# Patient Record
Sex: FEMALE | Race: BLACK OR AFRICAN AMERICAN | Employment: FULL TIME | URBAN - METROPOLITAN AREA
[De-identification: names, ages, dates, MRNs, and addresses within clinical notes are randomized per-mention and may not be internally consistent; named-entity substitution may affect disease eponyms.]

---

## 2020-06-08 ENCOUNTER — HOSPITAL ENCOUNTER (EMERGENCY)
Age: 40
Discharge: LEFT BEFORE DISCHARGE | End: 2020-06-08
Attending: STUDENT IN AN ORGANIZED HEALTH CARE EDUCATION/TRAINING PROGRAM

## 2020-06-08 ENCOUNTER — APPOINTMENT (OUTPATIENT)
Dept: CT IMAGING | Age: 40
End: 2020-06-08

## 2020-06-08 VITALS
HEIGHT: 67 IN | DIASTOLIC BLOOD PRESSURE: 88 MMHG | SYSTOLIC BLOOD PRESSURE: 139 MMHG | OXYGEN SATURATION: 96 % | WEIGHT: 219.14 LBS | BODY MASS INDEX: 34.39 KG/M2 | TEMPERATURE: 98.1 F | RESPIRATION RATE: 16 BRPM | HEART RATE: 82 BPM

## 2020-06-08 LAB
BILIRUBIN URINE: NEGATIVE
BLOOD, URINE: ABNORMAL
CLARITY: ABNORMAL
COLOR: YELLOW
EPITHELIAL CELLS, UA: 2 /HPF (ref 0–5)
GLUCOSE URINE: NEGATIVE MG/DL
HCG(URINE) PREGNANCY TEST: NEGATIVE
HYALINE CASTS: 1 /LPF (ref 0–8)
KETONES, URINE: NEGATIVE MG/DL
LEUKOCYTE ESTERASE, URINE: NEGATIVE
MICROSCOPIC EXAMINATION: YES
NITRITE, URINE: NEGATIVE
PH UA: 6 (ref 5–8)
PROTEIN UA: NEGATIVE MG/DL
RBC UA: 4 /HPF (ref 0–4)
SPECIFIC GRAVITY UA: 1.01 (ref 1–1.03)
URINE REFLEX TO CULTURE: ABNORMAL
URINE TYPE: ABNORMAL
UROBILINOGEN, URINE: 0.2 E.U./DL
WBC UA: 3 /HPF (ref 0–5)

## 2020-06-08 PROCEDURE — 84703 CHORIONIC GONADOTROPIN ASSAY: CPT

## 2020-06-08 PROCEDURE — 81001 URINALYSIS AUTO W/SCOPE: CPT

## 2020-06-08 PROCEDURE — 99283 EMERGENCY DEPT VISIT LOW MDM: CPT

## 2020-06-08 RX ORDER — PROMETHAZINE HYDROCHLORIDE 25 MG/1
25 TABLET ORAL EVERY 6 HOURS PRN
COMMUNITY

## 2020-06-08 RX ORDER — CLONAZEPAM 0.5 MG/1
TABLET ORAL
COMMUNITY
Start: 2020-03-27

## 2020-06-08 RX ORDER — OLANZAPINE 20 MG/1
20 TABLET ORAL NIGHTLY
COMMUNITY

## 2020-06-08 RX ORDER — OXYCODONE HYDROCHLORIDE AND ACETAMINOPHEN 5; 325 MG/1; MG/1
1 TABLET ORAL EVERY 6 HOURS PRN
COMMUNITY
End: 2020-07-07

## 2020-06-08 ASSESSMENT — PAIN DESCRIPTION - ONSET: ONSET: ON-GOING

## 2020-06-08 ASSESSMENT — PAIN DESCRIPTION - DESCRIPTORS: DESCRIPTORS: ACHING

## 2020-06-08 ASSESSMENT — PAIN DESCRIPTION - PAIN TYPE: TYPE: ACUTE PAIN

## 2020-06-08 ASSESSMENT — PAIN DESCRIPTION - PROGRESSION: CLINICAL_PROGRESSION: GRADUALLY IMPROVING

## 2020-06-08 ASSESSMENT — PAIN SCALES - GENERAL: PAINLEVEL_OUTOF10: 7

## 2020-06-08 ASSESSMENT — PAIN DESCRIPTION - FREQUENCY: FREQUENCY: CONTINUOUS

## 2020-06-08 ASSESSMENT — PAIN DESCRIPTION - LOCATION: LOCATION: BACK

## 2020-06-08 ASSESSMENT — PAIN - FUNCTIONAL ASSESSMENT: PAIN_FUNCTIONAL_ASSESSMENT: ACTIVITIES ARE NOT PREVENTED

## 2020-06-09 NOTE — ED PROVIDER NOTES
female who presents with complaint of dizziness after taking Percocet for her back pain. Exam nontoxic, in no acute distress, tenderness to palpation on the lumbar area. Given this presentation I did obtain a UA to make sure her symptoms were not secondary to UTI. Given the fact that her pain has been ongoing for 6 more than 4 weeks with no complaints of trauma and tenderness to palpation on the exam, I decided to obtain a CT scan. However, in the process of work-up patient decided to elope from the emergency room. ClINICAL IMPRESSION:  1. Low back pain, unspecified back pain laterality, unspecified chronicity, unspecified whether sciatica present          PATIENT REFERRED TO:  No follow-up provider specified. DISCHARGE MEDICATIONS:  Discharge Medication List as of 6/8/2020 11:41 PM        DISCONTINUED MEDICATIONS:  Discharge Medication List as of 6/8/2020 11:41 PM        DISPOSITION Eloped - Left Before Treatment Complete 06/08/2020 11:38:53 PM  Madie Mensah MD (electronically signed)  6/8/2020  _________________________________________________________________________________________  _________________________________________________________________________________________  This record is transcribed utilizing voice recognition technology. There are inherent limitations in this technology. In addition, there may be limitations in editing of this report. If there are any discrepancies, please contact me directly.         Chris Arce MD  06/08/20 7129

## 2020-06-09 NOTE — ED NOTES
Pt comes to ER after taking percocet and phenegran tonight for back pain. Pt states feeling weak and tired. Pt states didn't know I was going to make pt feel this way.        Kristin Gimenez RN  06/08/20 4250

## 2020-07-07 ENCOUNTER — HOSPITAL ENCOUNTER (EMERGENCY)
Age: 40
Discharge: HOME | End: 2020-07-07

## 2020-07-07 VITALS
HEART RATE: 86 BPM | OXYGEN SATURATION: 100 % | DIASTOLIC BLOOD PRESSURE: 85 MMHG | TEMPERATURE: 98.3 F | RESPIRATION RATE: 18 BRPM | BODY MASS INDEX: 34.46 KG/M2 | WEIGHT: 220 LBS | SYSTOLIC BLOOD PRESSURE: 129 MMHG

## 2020-07-07 LAB — S PYO AG THROAT QL: NEGATIVE

## 2020-07-07 PROCEDURE — 87880 STREP A ASSAY W/OPTIC: CPT

## 2020-07-07 PROCEDURE — 87081 CULTURE SCREEN ONLY: CPT

## 2020-07-07 PROCEDURE — 99282 EMERGENCY DEPT VISIT SF MDM: CPT

## 2020-07-07 PROCEDURE — 6370000000 HC RX 637 (ALT 250 FOR IP): Performed by: PHYSICIAN ASSISTANT

## 2020-07-07 RX ORDER — ACETAMINOPHEN 325 MG/1
650 TABLET ORAL ONCE
Status: COMPLETED | OUTPATIENT
Start: 2020-07-07 | End: 2020-07-07

## 2020-07-07 RX ORDER — IBUPROFEN 800 MG/1
800 TABLET ORAL EVERY 8 HOURS PRN
Qty: 30 TABLET | Refills: 0 | Status: SHIPPED | OUTPATIENT
Start: 2020-07-07 | End: 2021-01-17 | Stop reason: ALTCHOICE

## 2020-07-07 RX ORDER — ACETAMINOPHEN 500 MG
500 TABLET ORAL 4 TIMES DAILY PRN
Qty: 60 TABLET | Refills: 0 | Status: SHIPPED | OUTPATIENT
Start: 2020-07-07 | End: 2021-01-17 | Stop reason: ALTCHOICE

## 2020-07-07 RX ADMIN — ACETAMINOPHEN 650 MG: 325 TABLET ORAL at 08:15

## 2020-07-07 ASSESSMENT — PAIN DESCRIPTION - ONSET: ONSET: ON-GOING

## 2020-07-07 ASSESSMENT — PAIN SCALES - GENERAL
PAINLEVEL_OUTOF10: 5
PAINLEVEL_OUTOF10: 5

## 2020-07-07 ASSESSMENT — PAIN DESCRIPTION - FREQUENCY: FREQUENCY: CONTINUOUS

## 2020-07-07 ASSESSMENT — PAIN DESCRIPTION - DESCRIPTORS: DESCRIPTORS: BURNING

## 2020-07-07 ASSESSMENT — PAIN DESCRIPTION - LOCATION: LOCATION: THROAT

## 2020-07-07 ASSESSMENT — PAIN DESCRIPTION - PAIN TYPE: TYPE: ACUTE PAIN

## 2020-07-07 ASSESSMENT — PAIN DESCRIPTION - PROGRESSION: CLINICAL_PROGRESSION: NOT CHANGED

## 2020-07-07 NOTE — ED PROVIDER NOTES
[address] [facility]  [address] [address]  Dept: [phone]  Loc: [address] ENCOUNTER        This patient was not seen or evaluated by the attending physician. I evaluated this patient, the attending physician was available for consultation. CHIEF COMPLAINT    Chief Complaint   Patient presents with    Pharyngitis     yesterday  sore and hard to swallow       HPI    Maddi Longoria is a 41 y.o. female who presents with a sore throat. The onset was yesterday. The duration has been constant since the onset. The pain worsens with swallowing. The patient has no other symptoms. She denies cough, fever, chills, SOB. She denies recent sick contacts. She states it feels like when she had strep throat about 3 years ago. She does not think she could be pregnant. She has no further complaints at this time. REVIEW OF SYSTEMS    Pulmonary: No difficulty breathing  General: denies fevers, denies myalgia  GI: No abdominal pain, no vomiting  ENT: see HPI  All other systems reviewed and are negative. PAST MEDICAL AND SURGICAL HISTORY    Past Medical History:   Diagnosis Date    Bipolar 1 disorder ([facility] [address]     Diabetes mellitus ([facility] [address]      Past Surgical History:   Procedure Laterality Date     SECTION      CHOLECYSTECTOMY      ENDOMETRIAL ABLATION      KNEE SURGERY         CURRENT MEDICATIONS  (may include discharge medications prescribed in the ED)  Current Outpatient Rx   Medication Sig Dispense Refill    metFORMIN (GLUCOPHAGE) 500 MG tablet Take 250 mg by mouth 2 times daily (with meals)      Magic Mouthwash (MIRACLE MOUTHWASH) Swish and spit 10 mLs 4 times daily as needed for Irritation Dispense as Magic Mouthwash. Please add Equal Parts: 60 mL Maalox, 60 mL Viscous Lidocaine, 60 mL Benadryl to 60mL of Carafate. 10 ml swish and spit or swallow as directed above.  240 mL 0    acetaminophen (TYLENOL) 500 MG tablet Take 1 tablet by mouth 4 times daily as needed for Pain 60 tablet 0    ibuprofen (ADVIL;MOTRIN) 800 MG tablet Take 1 tablet by mouth every 8 hours as needed for Pain 30 tablet 0    clonazePAM (KLONOPIN) 0.5 MG tablet TAKE 1 TABLET BY MOUTH UP TO THREE TIMES A DAY AS NEEDED FOR ANXIETY *NEEDS NEW INS*      OLANZapine (ZYPREXA) 20 MG tablet Take 20 mg by mouth nightly      promethazine (PHENERGAN) 25 MG tablet Take 25 mg by mouth every 6 hours as needed for Nausea         ALLERGIES    No Known Allergies    FAMILY AND SOCIAL HISTORY    History reviewed. No pertinent family history.   Social History     Socioeconomic History    Marital status: Legally      Spouse name: None    Number of children: None    Years of education: None    Highest education level: None   Occupational History    None   Social Needs    Financial resource strain: None    Food insecurity     Worry: None     Inability: None    Transportation needs     Medical: None     Non-medical: None   Tobacco Use    Smoking status: Current Every Day Smoker     Packs/day: 1.00     Types: Cigarettes    Smokeless tobacco: Never Used   Substance and Sexual Activity    Alcohol use: Not Currently    Drug use: Never    Sexual activity: None   Lifestyle    Physical activity     Days per week: None     Minutes per session: None    Stress: None   Relationships    Social connections     Talks on phone: None     Gets together: None     Attends Restoration service: None     Active member of club or organization: None     Attends meetings of clubs or organizations: None     Relationship status: None    Intimate partner violence     Fear of current or ex partner: None     Emotionally abused: None     Physically abused: None     Forced sexual activity: None   Other Topics Concern    None   Social History Narrative    None       PHYSICAL EXAM    VITAL SIGNS: /85   Pulse 86   Temp 98.3 °F (36.8 °C) (Oral) Resp 18   Wt 220 lb (99.8 kg)   SpO2 100%   BMI 34.46 kg/m²   Constitutional:  Well developed, well nourished, no acute distress  Eyes: Sclera nonicteric, conjunctiva normal   Throat/Face:  nonerythematous posterior pharynx, no tonsillar hypertrophy, no exudates, no midline shift of the uvula, no macroglossia, no trismus, no facial swelling  Neck: no tender lymphadenopathy, supple neck without meningismus  Ears: normal, no auricular redness or induration  Respiratory:  Lungs clear to auscultation, no retractions  Cardiovascular:  regular rate, normal rhythm, no murmurs  Musculoskeletal:  No edema   Neurologic: Awake, alert and oriented, no slurred speech, no tremors or ataxia, no athetotic movements  Integument:  Skin is warm and dry, no rash    RADIOLOGY/PROCEDURES    No orders to display       ED COURSE & MEDICAL DECISION MAKING    See chart for details of medications prescribed    Vitals:    07/07/20 0743   BP: 129/85   Pulse: 86   Resp: 18   Temp: 98.3 °F (36.8 °C)   TempSrc: Oral   SpO2: 100%   Weight: 220 lb (99.8 kg)       Differential diagnosis: Group A strep, Airway Obstruction, Epiglottitis, Retropharyngeal Abscess, Parapharyngeal Abscess, Pneumonia, Hypoxemia, Dehydration, Reflux Pharyngitis, other    Patient is afebrile with no evidence of stridor, drooling, posturing or respiratory distress. Rapid strep test is negative. We discussed this could be a viral pharyngitis, and symptomatic care is recommended. She will be d/c home with motrin, tylenol, and magic mouthwash. She was advised that if she tested negative for strep, a culture would be sent and antibiotics would be called in for her, but at this time I do not feel that empiric tx is appropriate. The patient was instructed to follow up as an outpatient in 2 days. The patient was instructed to return to the ED immediately for any new or worsening symptoms. The patient verbalized understanding. FINAL IMPRESSION    1.  Acute pharyngitis,

## 2020-07-08 ENCOUNTER — CARE COORDINATION (OUTPATIENT)
Dept: CARE COORDINATION | Age: 40
End: 2020-07-08

## 2020-07-08 NOTE — CARE COORDINATION
Patient contacted regarding recent discharge and COVID-19 risk. Discussed COVID-19 related testing which was not done at this time. Test results were not done. Patient informed of results, if available? No     Care Transition Nurse/ Ambulatory Care Manager contacted the patient by telephone to perform post discharge assessment. Verified name and  with patient as identifiers. Patient has following risk factors of: no known risk factors. CTN/ACM reviewed discharge instructions, medical action plan and red flags related to discharge diagnosis. Reviewed and educated them on any new and changed medications related to discharge diagnosis. Advised obtaining a 90-day supply of all daily and as-needed medications. Education provided regarding infection prevention, and signs and symptoms of COVID-19 and when to seek medical attention with patient who verbalized understanding. Discussed exposure protocols and quarantine from [address] you at higher risk for severe illness  and given an opportunity for questions and concerns. The patient agrees to contact the COVID-19 hotline [phone] or PCP office for questions related to their healthcare. CTN/ACM provided contact information for future reference. From CDC: Are you at higher risk for severe illness?  Wash your hands often.  Avoid close contact (6 feet, which is about two arm lengths) with people who are sick.  Put distance between yourself and other people if COVID-19 is spreading in your community.  Clean and disinfect frequently touched surfaces.  Avoid all cruise travel and non-essential air travel.  Call your healthcare professional if you have concerns about COVID-19 and your underlying condition or if you are sick. For more information on steps you can take to protect yourself, see CDC's How to Protect Yourself    Pt will be further monitored by COVID Loop Team based on severity of symptoms and risk factors.   Email:

## 2020-07-09 LAB — S PYO THROAT QL CULT: NORMAL

## 2020-11-07 ENCOUNTER — HOSPITAL ENCOUNTER (EMERGENCY)
Age: 40
Discharge: HOME | End: 2020-11-07
Attending: EMERGENCY MEDICINE

## 2020-11-07 VITALS
TEMPERATURE: 97.2 F | HEIGHT: 67 IN | HEART RATE: 89 BPM | RESPIRATION RATE: 20 BRPM | OXYGEN SATURATION: 97 % | WEIGHT: 202 LBS | BODY MASS INDEX: 31.71 KG/M2 | DIASTOLIC BLOOD PRESSURE: 97 MMHG | SYSTOLIC BLOOD PRESSURE: 145 MMHG

## 2020-11-07 PROCEDURE — 99283 EMERGENCY DEPT VISIT LOW MDM: CPT

## 2020-11-07 PROCEDURE — 6370000000 HC RX 637 (ALT 250 FOR IP): Performed by: EMERGENCY MEDICINE

## 2020-11-07 RX ORDER — ALPRAZOLAM 0.5 MG/1
0.5 TABLET ORAL ONCE
Status: COMPLETED | OUTPATIENT
Start: 2020-11-07 | End: 2020-11-07

## 2020-11-07 RX ADMIN — ALPRAZOLAM 0.5 MG: 0.5 TABLET ORAL at 04:53

## 2020-11-07 RX ADMIN — ALPRAZOLAM 0.5 MG: 0.5 TABLET ORAL at 03:49

## 2020-11-07 ASSESSMENT — ENCOUNTER SYMPTOMS
EYE ITCHING: 0
CONSTIPATION: 0
SHORTNESS OF BREATH: 0
COLOR CHANGE: 0
ABDOMINAL PAIN: 0
VOMITING: 0
COUGH: 0
EYE DISCHARGE: 0

## 2020-11-07 NOTE — ED TRIAGE NOTES
Pt reports to ED for a panic attack starting approx. 0200. Pt skin warm and dry. Pt respirations easy and unlabored. Pt does not appear to be in acute distress. Pt A&Ox4.

## 2020-11-07 NOTE — ED PROVIDER NOTES
[address] [facility]      Pt Name: Flor Pike  MRN: [id number]  [place] 1980  Date of evaluation: 11/7/2020  Provider: Saundra Comer MD    CHIEF COMPLAINT       Chief Complaint   Patient presents with    Panic Attack     >1 hour of panic attack and anxiety. Patient takes Clonazepam, last at 0230. Patient with Anxiety, PTSD, Bipolar. HISTORY OF PRESENT ILLNESS    Flor Pike is a 41 y.o. female who presents to the emergency department with panic attack. Endorses panic attack. Takes Clonazepam. States she woke up after bad dream with panic attack. No other associated symptoms. Nursing Notes were reviewed. Including nursing noted for FM, Surgical History, Past Medical History, Social History, vitals, and allergies; agree with all. REVIEW OF SYSTEMS       Review of Systems   Constitutional: Negative for diaphoresis and unexpected weight change. HENT: Negative for congestion and dental problem. Eyes: Negative for discharge and itching. Respiratory: Negative for cough and shortness of breath. Cardiovascular: Negative for chest pain and leg swelling. Gastrointestinal: Negative for abdominal pain, constipation and vomiting. Endocrine: Negative for cold intolerance and heat intolerance. Genitourinary: Negative for vaginal bleeding, vaginal discharge and vaginal pain. Musculoskeletal: Negative for neck pain and neck stiffness. Skin: Negative for color change and pallor. Neurological: Negative for tremors and weakness. Psychiatric/Behavioral: Negative for agitation and behavioral problems. The patient is nervous/anxious. Except as noted above the remainder of the review of systems was reviewed and negative.      PAST MEDICAL HISTORY     Past Medical History:   Diagnosis Date    Bipolar 1 disorder ([facility] [address]     Diabetes mellitus ([facility] [address]        SURGICAL HISTORY       Past Surgical History:   Procedure Laterality Date   SECTION      CHOLECYSTECTOMY      ENDOMETRIAL ABLATION      KNEE SURGERY         CURRENT MEDICATIONS       Previous Medications    ACETAMINOPHEN (TYLENOL) 500 MG TABLET    Take 1 tablet by mouth 4 times daily as needed for Pain    CLONAZEPAM (KLONOPIN) 0.5 MG TABLET    TAKE 1 TABLET BY MOUTH UP TO THREE TIMES A DAY AS NEEDED FOR ANXIETY *NEEDS NEW INS*    IBUPROFEN (ADVIL;MOTRIN) 800 MG TABLET    Take 1 tablet by mouth every 8 hours as needed for Pain    MAGIC MOUTHWASH (MIRACLE MOUTHWASH)    Swish and spit 10 mLs 4 times daily as needed for Irritation Dispense as Magic Mouthwash. Please add Equal Parts: 60 mL Maalox, 60 mL Viscous Lidocaine, 60 mL Benadryl to 60mL of Carafate. 10 ml swish and spit or swallow as directed above. METFORMIN (GLUCOPHAGE) 500 MG TABLET    Take 250 mg by mouth 2 times daily (with meals)    OLANZAPINE (ZYPREXA) 20 MG TABLET    Take 20 mg by mouth nightly    PROMETHAZINE (PHENERGAN) 25 MG TABLET    Take 25 mg by mouth every 6 hours as needed for Nausea       ALLERGIES     Patient has no known allergies. FAMILY HISTORY      History reviewed. No pertinent family history.     SOCIAL HISTORY       Social History     Socioeconomic History    Marital status: Legally      Spouse name: None    Number of children: None    Years of education: None    Highest education level: None   Occupational History    None   Social Needs    Financial resource strain: None    Food insecurity     Worry: None     Inability: None    Transportation needs     Medical: None     Non-medical: None   Tobacco Use    Smoking status: Current Every Day Smoker     Packs/day: 1.00     Types: Cigarettes    Smokeless tobacco: Never Used   Substance and Sexual Activity    Alcohol use: Not Currently    Drug use: Never    Sexual activity: None   Lifestyle    Physical activity     Days per week: None     Minutes per session: None    Stress: None   Relationships    Social connections Talks on phone: None     Gets together: None     Attends Latter-day service: None     Active member of club or organization: None     Attends meetings of clubs or organizations: None     Relationship status: None    Intimate partner violence     Fear of current or ex partner: None     Emotionally abused: None     Physically abused: None     Forced sexual activity: None   Other Topics Concern    None   Social History Narrative    None       PHYSICAL EXAM       ED Triage Vitals   BP Temp Temp Source Pulse Resp SpO2 Height Weight   11/07/20 0325 11/07/20 0325 11/07/20 0325 11/07/20 0353 11/07/20 0325 11/07/20 0325 11/07/20 0325 11/07/20 0325   (!) 145/97 97.2 °F (36.2 °C) Oral 89 20 97 % 5' 7\" (1.702 m) 202 lb (91.6 kg)       Physical Exam  Vitals signs and nursing note reviewed. Constitutional:       General: She is not in acute distress. Appearance: She is well-developed. She is not ill-appearing, toxic-appearing or diaphoretic. HENT:      Head: Normocephalic and atraumatic. Right Ear: External ear normal.      Left Ear: External ear normal.   Eyes:      General:         Right eye: No discharge. Left eye: No discharge. Conjunctiva/sclera: Conjunctivae normal.      Pupils: Pupils are equal, round, and reactive to light. Neck:      Musculoskeletal: Normal range of motion and neck supple. Cardiovascular:      Rate and Rhythm: Normal rate and regular rhythm. Heart sounds: No murmur. Pulmonary:      Effort: Pulmonary effort is normal. No respiratory distress. Breath sounds: Normal breath sounds. No wheezing or rales. Abdominal:      General: Bowel sounds are normal. There is no distension. Palpations: Abdomen is soft. There is no mass. Tenderness: There is no abdominal tenderness. There is no guarding or rebound. Genitourinary:     Comments: Deferred  Musculoskeletal: Normal range of motion. General: No deformity. Skin:     General: Skin is warm. Findings: No erythema or rash. Neurological:      Mental Status: She is alert and oriented to person, place, and time. She is not disoriented. Cranial Nerves: No cranial nerve deficit. Motor: No atrophy or abnormal muscle tone. Coordination: Coordination normal.   Psychiatric:         Mood and Affect: Mood is anxious. Mood is not depressed. Behavior: Behavior normal.         Thought Content: Thought content normal. Thought content does not include suicidal ideation. Thought content does not include suicidal plan. DIAGNOSTIC RESULTS     EKG: All EKG's are interpreted by the Emergency Department Physician who either signs or Co-signs this chart in the absence of acardiologist.    None    RADIOLOGY:   Non-plain film images such as CT, Ultrasoundand MRI are read by the radiologist. Plain radiographic images are visualized and preliminarily interpreted by the emergency physician with the below findings:    None    ED BEDSIDE ULTRASOUND:   Performed by ED Physician - none    LABS:  Labs Reviewed - No data to display    All other labs were withinnormal range or not returned as of this dictation. EMERGENCY DEPARTMENT COURSE and DIFFERENTIAL DIAGNOSIS/MDM:     PMH, Surgical Hx, FH, Social Hx reviewed by myself (ETOH usage, Tobacco usage, Drug usage reviewed by myself, no pertinent Hx)- No Pertinent Hx     Old records were reviewed by me    Xanax given and feels much better    I estimate there is LOW risk for Sepsis, MI, Stroke, Tamponade, PTX, Toxicity or other life threatening etiology thus I consider the discharge disposition reasonable. The patient is at low risk for mortality based on demographic, history and clinical factors. Given the best available information and clinical assessment, I estimate the risk of hospitalization to be greater than risk of treatment at home.  I have explained to the patient that the risk could rapidly change, given precautions for return and instructions. Explained to patient that the risk for mortality is low based on demographic, history and clinical factors. I discussed with patient the results of evaluation in the ED, diagnosis, care, and prognosis. The plan is to discharge to home. Patient is in agreement with plan and questions have been answered. I also discussed with patient the reasons which may require a return visit and the importance of follow-up care. The patient is well-appearing, nontoxic, and improved at the time of discharge. Patient agrees to call to arrange follow-up care as directed. Patient understands to return immediately for worsening/change in symptoms. CRITICAL CARE TIME   Total Critical Caretime was 21 minutes, excluding separately reportable procedures. There was a high probability of clinically significant/life threatening deterioration in the patient's condition which required my urgent intervention. PROCEDURES:  Unlessotherwise noted below, none    FINAL IMPRESSION      1. Panic attack          DISPOSITION/PLAN   DISPOSITION Discharge - Pending Orders Complete 11/07/2020 04:21:38 AM    PATIENT REFERRED TO:  Algie Meigs  [address] [place] [address].   [address] [place] [address],[address]  [phone]    Call today        DISCHARGE MEDICATIONS:  New Prescriptions    No medications on file          (Please note that portions ofthis note were completed with a voice recognition program.  Efforts were made to edit the dictations but occasionally words are mis-transcribed.)    Kerline Davis MD(electronically signed)  Attending Emergency Physician        Kerline Davis MD  11/07/20 1795

## 2021-01-17 ENCOUNTER — HOSPITAL ENCOUNTER (EMERGENCY)
Age: 41
Discharge: HOME | End: 2021-01-17
Attending: EMERGENCY MEDICINE

## 2021-01-17 VITALS
WEIGHT: 195.77 LBS | DIASTOLIC BLOOD PRESSURE: 78 MMHG | OXYGEN SATURATION: 99 % | RESPIRATION RATE: 14 BRPM | HEART RATE: 76 BPM | SYSTOLIC BLOOD PRESSURE: 126 MMHG | TEMPERATURE: 98.1 F | BODY MASS INDEX: 30.73 KG/M2 | HEIGHT: 67 IN

## 2021-01-17 DIAGNOSIS — N76.0 BV (BACTERIAL VAGINOSIS): ICD-10-CM

## 2021-01-17 DIAGNOSIS — B96.89 BV (BACTERIAL VAGINOSIS): ICD-10-CM

## 2021-01-17 DIAGNOSIS — J02.9 VIRAL PHARYNGITIS: Primary | ICD-10-CM

## 2021-01-17 LAB
BACTERIA WET PREP: ABNORMAL
BACTERIA: ABNORMAL /HPF
BILIRUBIN URINE: NEGATIVE
BLOOD, URINE: ABNORMAL
CLARITY: ABNORMAL
CLUE CELLS: ABNORMAL
COLOR: YELLOW
EPITHELIAL CELLS WET PREP: ABNORMAL
EPITHELIAL CELLS, UA: 28 /HPF (ref 0–5)
GLUCOSE URINE: NEGATIVE MG/DL
HYALINE CASTS: ABNORMAL /LPF (ref 0–2)
KETONES, URINE: NEGATIVE MG/DL
LEUKOCYTE ESTERASE, URINE: ABNORMAL
MICROSCOPIC EXAMINATION: YES
NITRITE, URINE: NEGATIVE
PH UA: 6 (ref 5–8)
PROTEIN UA: ABNORMAL MG/DL
RBC UA: 4 /HPF (ref 0–4)
RBC WET PREP: ABNORMAL
S PYO AG THROAT QL: NEGATIVE
SOURCE WET PREP: ABNORMAL
SPECIFIC GRAVITY UA: 1.02 (ref 1–1.03)
TRICHOMONAS PREP: ABNORMAL
URINE REFLEX TO CULTURE: ABNORMAL
URINE TYPE: ABNORMAL
UROBILINOGEN, URINE: 0.2 E.U./DL
WBC UA: 8 /HPF (ref 0–5)
WBC WET PREP: ABNORMAL
YEAST WET PREP: ABNORMAL

## 2021-01-17 PROCEDURE — 87880 STREP A ASSAY W/OPTIC: CPT

## 2021-01-17 PROCEDURE — 87491 CHLMYD TRACH DNA AMP PROBE: CPT

## 2021-01-17 PROCEDURE — 6360000002 HC RX W HCPCS: Performed by: EMERGENCY MEDICINE

## 2021-01-17 PROCEDURE — 87081 CULTURE SCREEN ONLY: CPT

## 2021-01-17 PROCEDURE — U0002 COVID-19 LAB TEST NON-CDC: HCPCS

## 2021-01-17 PROCEDURE — 87210 SMEAR WET MOUNT SALINE/INK: CPT

## 2021-01-17 PROCEDURE — 87591 N.GONORRHOEAE DNA AMP PROB: CPT

## 2021-01-17 PROCEDURE — 96372 THER/PROPH/DIAG INJ SC/IM: CPT

## 2021-01-17 PROCEDURE — 99284 EMERGENCY DEPT VISIT MOD MDM: CPT

## 2021-01-17 PROCEDURE — 81001 URINALYSIS AUTO W/SCOPE: CPT

## 2021-01-17 PROCEDURE — 6370000000 HC RX 637 (ALT 250 FOR IP): Performed by: EMERGENCY MEDICINE

## 2021-01-17 PROCEDURE — U0003 INFECTIOUS AGENT DETECTION BY NUCLEIC ACID (DNA OR RNA); SEVERE ACUTE RESPIRATORY SYNDROME CORONAVIRUS 2 (SARS-COV-2) (CORONAVIRUS DISEASE [COVID-19]), AMPLIFIED PROBE TECHNIQUE, MAKING USE OF HIGH THROUGHPUT TECHNOLOGIES AS DESCRIBED BY CMS-2020-01-R: HCPCS

## 2021-01-17 RX ORDER — AZITHROMYCIN 500 MG/1
1000 TABLET, FILM COATED ORAL ONCE
Status: COMPLETED | OUTPATIENT
Start: 2021-01-17 | End: 2021-01-17

## 2021-01-17 RX ORDER — CEFTRIAXONE SODIUM 250 MG/1
250 INJECTION, POWDER, FOR SOLUTION INTRAMUSCULAR; INTRAVENOUS ONCE
Status: COMPLETED | OUTPATIENT
Start: 2021-01-17 | End: 2021-01-17

## 2021-01-17 RX ORDER — METRONIDAZOLE 500 MG/1
500 TABLET ORAL 2 TIMES DAILY
Qty: 14 TABLET | Refills: 0 | Status: SHIPPED | OUTPATIENT
Start: 2021-01-17 | End: 2021-01-24

## 2021-01-17 RX ORDER — DEXAMETHASONE 4 MG/1
8 TABLET ORAL ONCE
Status: COMPLETED | OUTPATIENT
Start: 2021-01-17 | End: 2021-01-17

## 2021-01-17 RX ADMIN — CEFTRIAXONE SODIUM 250 MG: 250 INJECTION, POWDER, FOR SOLUTION INTRAMUSCULAR; INTRAVENOUS at 20:28

## 2021-01-17 RX ADMIN — DEXAMETHASONE 8 MG: 4 TABLET ORAL at 20:28

## 2021-01-17 RX ADMIN — AZITHROMYCIN 1000 MG: 500 TABLET, FILM COATED ORAL at 20:28

## 2021-01-17 NOTE — ED PROVIDER NOTES
[facility]  eMERGENCY dEPARTMENT eNCOUnter      Pt Name: Flor Davey  MRN: [id number]  [place] 1980  Date of evaluation: 2021  Provider: Nena Lowe MD    CHIEF COMPLAINT       Chief Complaint   Patient presents with    Covid Testing     requesting COVID testing and STD check    Exposure to STD         HISTORY OF PRESENT ILLNESS  (Location/Symptom, Timing/Onset, Context/Setting, Quality, Duration, Modifying Factors, Severity.)   Flor Davey is a 41 y.o. female who presents to the emergency department lasting Covid testing and testing for STDs. She states she was at a bar about a week and a half 2 weeks ago. She had her mask off drinking and other people with her mask off. About a week ago she started feeling achy. She is had a sore throat. No fever. No cough. She wants to make sure she does not have Covid. She also broke up with her boyfriend about the same time about 2 weeks ago. She has had some clear to white discharge. No abdominal pain. No frequency urgency or dysuria. She is not menstruating, she had an ablation in . She wants to be checked for STDs. She has an appointment with the health department next week to get HIV testing done. Nursing Notes were reviewed and I agree. REVIEW OF SYSTEMS    (2-9 systems for level 4, 10 or more for level 5)     ENT: Sore throat. Cardiovascular: No chest pain. Pulmonary: No cough. GI: No abdominal pain nausea vomiting. : Vaginal discharge as above. No frequency urgency or dysuria. Status post ablation, no menses  Except as noted above the remainder of the review of systems was reviewed and negative.        PAST MEDICAL HISTORY         Diagnosis Date    Bipolar 1 disorder ([address]     Diabetes mellitus ([facility] [address]        SURGICAL HISTORY           Procedure Laterality Date     SECTION      CHOLECYSTECTOMY      ENDOMETRIAL ABLATION      KNEE SURGERY         CURRENT MEDICATIONS       Previous Medications    ACETAMINOPHEN (TYLENOL) 500 MG TABLET    Take 1 tablet by mouth 4 times daily as needed for Pain    CLONAZEPAM (KLONOPIN) 0.5 MG TABLET    TAKE 1 TABLET BY MOUTH UP TO THREE TIMES A DAY AS NEEDED FOR ANXIETY *NEEDS NEW INS*    IBUPROFEN (ADVIL;MOTRIN) 800 MG TABLET    Take 1 tablet by mouth every 8 hours as needed for Pain    MAGIC MOUTHWASH (MIRACLE MOUTHWASH)    Swish and spit 10 mLs 4 times daily as needed for Irritation Dispense as Magic Mouthwash. Please add Equal Parts: 60 mL Maalox, 60 mL Viscous Lidocaine, 60 mL Benadryl to 60mL of Carafate. 10 ml swish and spit or swallow as directed above. METFORMIN (GLUCOPHAGE) 500 MG TABLET    Take 250 mg by mouth 2 times daily (with meals)    OLANZAPINE (ZYPREXA) 20 MG TABLET    Take 20 mg by mouth nightly    PROMETHAZINE (PHENERGAN) 25 MG TABLET    Take 25 mg by mouth every 6 hours as needed for Nausea       ALLERGIES     Patient has no known allergies. FAMILY HISTORY     History reviewed. No pertinent family history. No family status information on file. SOCIAL HISTORY      reports that she has been smoking cigarettes. She has been smoking about 1.00 pack per day. She has never used smokeless tobacco. She reports previous alcohol use. She reports that she does not use drugs. PHYSICAL EXAM    (up to 7 for level 4, 8 or more for level 5)     ED Triage Vitals [01/17/21 1831]   BP Temp Temp Source Pulse Resp SpO2 Height Weight   (!) 148/82 97.7 °F (36.5 °C) Temporal 81 16 99 % 5' 7\" (1.702 m) 195 lb 12.3 oz (88.8 kg)       General: Alert black female no acute distress. Head: Atraumatic and normocephalic. Eyes: No conjunctival injection. Pupils equal round reactive. ENT: Viv Arshad is clear. TMs are normal.  Oropharynx shows some mild posterior pharyngeal erythema. No tonsillar enlargement. No exudate. Uvula is midline. Neck: Supple without adenopathy, nontender. Heart: Regular rate and rhythm.   No murmurs or gallops noted. Lungs: Breath sounds equal bilaterally and clear. Abdomen: Soft, nondistended, nontender. No masses organomegaly. DIAGNOSTIC RESULTS     RADIOLOGY:   Non-plain film images such as CT, Ultrasound and MRI are read by the radiologist. Plain radiographic images are visualized and preliminarily interpreted by Leslie Gomez MD with the below findings:      Interpretation per the Radiologist below, if available at the time of this note:    No orders to display       LABS:  Labs Reviewed   WET PREP, GENITAL - Abnormal; Notable for the following components:       Result Value    Clue Cells, Wet Prep 3+ (*)     All other components within normal limits    Narrative:     Performed at:  [place] [address]   Phone [phone]   URINE RT REFLEX TO CULTURE - Abnormal; Notable for the following components:    Clarity, UA CLOUDY (*)     Blood, Urine SMALL (*)     Protein, UA TRACE (*)     Leukocyte Esterase, Urine SMALL (*)     All other components within normal limits    Narrative:     Performed at:  [place] [address]   Phone [phone]   MICROSCOPIC URINALYSIS - Abnormal; Notable for the following components:    Hyaline Casts, UA 3-5 (*)     Bacteria, UA 2+ (*)     WBC, UA 8 (*)     Epithelial Cells, UA 28 (*)     All other components within normal limits    Narrative:     Performed at:  [address] [address]   Phone [phone] THROAT    Narrative:     Performed at:  [place] [address]   Phone [phone]   C. TRACHOMATIS N.GONORRHOEAE DNA   CULTURE, BETA STREP CONFIRM PLATES   [id number]       All other labs were within normal range or not returned as of this dictation.     EMERGENCY DEPARTMENT COURSE and DIFFERENTIAL DIAGNOSIS/MDM:   Vitals:    Vitals:    01/17/21 1831   BP: (!) 148/82   Pulse: 81   Resp: 16   Temp: 97.7 °F (36.5 °C)   TempSrc: Temporal   SpO2: 99%   Weight: 195 lb 12.3 oz (88.8 kg)   Height: 5' 7\" (1.702 m)       This patient has 2 main complaints. She has a sore throat. She had an exposure in public where she is concerned about Covid. She wants Covid testing. She has some pharyngeal erythema. No clinical evidence of peritonsillar abscess or retropharyngeal abscess. I have a low index of suspicion for epiglottitis. Her strep screen is negative. Is likely a viral pharyngitis. Covid testing is pending. She has a vaginal discharge. She broke up with her boyfriend. She wants to be checked for STDs. She self swab. She has 3+ clue cells. She will be treated for bacterial vaginosis. Her DNA probe is pending. We discussed treatment options. She wants to be treated for gonorrhea and chlamydia pending the test results. She was given Rocephin and azithromycin. She has an appointment with the health department to be checked for HIV. PROCEDURES:  None    FINAL IMPRESSION      1. Viral pharyngitis    2. BV (bacterial vaginosis)          DISPOSITION/PLAN   DISPOSITION Decision To Discharge 01/17/2021 07:59:17 PM      PATIENT REFERRED TO:  Sherri Munguia  [address] [place] [address].   [address] [place] [id number] [phone]    In 1 week        DISCHARGE MEDICATIONS:  New Prescriptions    METRONIDAZOLE (FLAGYL) 500 MG TABLET    Take 1 tablet by mouth 2 times daily for 7 days       (Please note that portions of this note were completed with a voice recognition program.  Efforts were made to edit the dictations but occasionally words are mis-transcribed.)    Marlin Salmeron MD  Attending Emergency Physician        Grant Jefferson MD  01/17/21 2001

## 2021-01-17 NOTE — ED TRIAGE NOTES
Patient requesting COVID test.  States she \"was in a crowd of people\" and now has a sore throat and dry cough. Also would like STD testing. Recently broke up with boyfriend and wants \"to be sure I don't have anything\". Has a PMD appointment for next month but \"doesn't want to wait\". Patient describes + vaginal discharge with odor.

## 2021-01-18 ENCOUNTER — CARE COORDINATION (OUTPATIENT)
Dept: CARE COORDINATION | Age: 41
End: 2021-01-18

## 2021-01-18 LAB
C TRACH DNA GENITAL QL NAA+PROBE: NEGATIVE
N. GONORRHOEAE DNA: NEGATIVE
SARS-COV-2, PCR: NOT DETECTED

## 2021-01-19 ENCOUNTER — CARE COORDINATION (OUTPATIENT)
Dept: CARE COORDINATION | Age: 41
End: 2021-01-19

## 2021-01-19 LAB — S PYO THROAT QL CULT: NORMAL
